# Patient Record
Sex: FEMALE | Race: BLACK OR AFRICAN AMERICAN | Employment: UNEMPLOYED | URBAN - METROPOLITAN AREA
[De-identification: names, ages, dates, MRNs, and addresses within clinical notes are randomized per-mention and may not be internally consistent; named-entity substitution may affect disease eponyms.]

---
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
## 2021-11-07 ENCOUNTER — APPOINTMENT (OUTPATIENT)
Dept: GENERAL RADIOLOGY | Age: 21
End: 2021-11-07
Payer: COMMERCIAL

## 2021-11-07 ENCOUNTER — HOSPITAL ENCOUNTER (EMERGENCY)
Age: 21
Discharge: HOME | End: 2021-11-07
Attending: EMERGENCY MEDICINE
Payer: COMMERCIAL

## 2021-11-07 VITALS
TEMPERATURE: 98.4 F | OXYGEN SATURATION: 99 % | DIASTOLIC BLOOD PRESSURE: 79 MMHG | SYSTOLIC BLOOD PRESSURE: 118 MMHG | HEART RATE: 79 BPM | RESPIRATION RATE: 16 BRPM

## 2021-11-07 DIAGNOSIS — J45.901 MILD ASTHMA WITH EXACERBATION, UNSPECIFIED WHETHER PERSISTENT: Primary | ICD-10-CM

## 2021-11-07 PROCEDURE — 71046 X-RAY EXAM CHEST 2 VIEWS: CPT

## 2021-11-07 PROCEDURE — 6370000000 HC RX 637 (ALT 250 FOR IP): Performed by: STUDENT IN AN ORGANIZED HEALTH CARE EDUCATION/TRAINING PROGRAM

## 2021-11-07 PROCEDURE — 99284 EMERGENCY DEPT VISIT MOD MDM: CPT

## 2021-11-07 RX ORDER — ALBUTEROL SULFATE 90 UG/1
1-2 AEROSOL, METERED RESPIRATORY (INHALATION) EVERY 4 HOURS PRN
Qty: 1 EACH | Refills: 0 | Status: SHIPPED | OUTPATIENT
Start: 2021-11-07

## 2021-11-07 RX ORDER — PREDNISONE 20 MG/1
60 TABLET ORAL DAILY
Qty: 12 TABLET | Refills: 0 | Status: SHIPPED | OUTPATIENT
Start: 2021-11-07 | End: 2021-11-11

## 2021-11-07 RX ORDER — PREDNISONE 20 MG/1
60 TABLET ORAL ONCE
Status: COMPLETED | OUTPATIENT
Start: 2021-11-07 | End: 2021-11-07

## 2021-11-07 RX ORDER — BUDESONIDE AND FORMOTEROL FUMARATE DIHYDRATE 160; 4.5 UG/1; UG/1
2 AEROSOL RESPIRATORY (INHALATION) 2 TIMES DAILY
Qty: 30.6 G | Refills: 0 | Status: SHIPPED | OUTPATIENT
Start: 2021-11-07

## 2021-11-07 RX ADMIN — PREDNISONE 60 MG: 20 TABLET ORAL at 11:39

## 2021-11-07 NOTE — ED PROVIDER NOTES
[facility]  ED  Emergency Department Encounter  EmergencyMedicine Resident     Pt Name:Misty Jarquin  MRN: [id number]  [place] 2000  Date of evaluation: 11/7/21  PCP:  Dionicio Edmondson MD    [address] [place] [address]       Chief Complaint   Patient presents with    Shortness of Breath       HISTORY OF PRESENT ILLNESS  (Location/Symptom, Timing/Onset, Context/Setting, Quality, Duration, Modifying Factors, Severity.)      Patricia Chaudhary is a 22 y.o. female who presents with acute onset shortness of breath for past couple hours. Patient believes she is having an asthma exacerbation. She has been out of town staying in [place] [address] with friends that have cats and other allergens present. States that animals and dust is a trigger for her. States that she is out of her medications, takes albuterol and a \"red inhaler\". Came by EMS, got 1 treatment of albuterol prior to arrival.  She is denying chest pain shortness breath at this time, 9 nausea vomiting fever diarrhea. PAST MEDICAL / SURGICAL / SOCIAL / FAMILY HISTORY      has a past medical history of Asthma, Atopic dermatitis, Eczema, Impaired glucose tolerance, Obesity, Pneumonia, and Pre-diabetes. has a past surgical history that includes Adenoidectomy; Tonsillectomy; fracture surgery (9/9/12 ); and Leg Surgery (Right, 6/22/2015). Social History     Socioeconomic History    Marital status: Single     Spouse name: Not on file    Number of children: Not on file    Years of education: Not on file    Highest education level: Not on file   Occupational History    Not on file   Tobacco Use    Smoking status: Never Smoker    Smokeless tobacco: Never Used   Substance and Sexual Activity    Alcohol use: No    Drug use: No    Sexual activity: Never   Other Topics Concern    Not on file   Social History Narrative    Lives at home with mom, 1 sister. Attends school, in 6th grade.      Social Determinants of Health     Financial Resource Strain:     Difficulty of Paying Living Expenses: Not on file   Food Insecurity:     Worried About Running Out of Food in the Last Year: Not on file    Willow of Food in the Last Year: Not on file   Transportation Needs:     Lack of Transportation (Medical): Not on file    Lack of Transportation (Non-Medical):  Not on file   Physical Activity:     Days of Exercise per Week: Not on file    Minutes of Exercise per Session: Not on file   Stress:     Feeling of Stress : Not on file   Social Connections:     Frequency of Communication with Friends and Family: Not on file    Frequency of Social Gatherings with Friends and Family: Not on file    Attends [employer] Services: Not on file    Active Member of Clubs or Organizations: Not on file    Attends Club or Organization Meetings: Not on file    Marital Status: Not on file   Intimate Partner Violence:     Fear of Current or Ex-Partner: Not on file    Emotionally Abused: Not on file    Physically Abused: Not on file    Sexually Abused: Not on file   Housing Stability:     Unable to Pay for Housing in the Last Year: Not on file    Number of Places Lived in the Last Year: Not on file    Unstable Housing in the Last Year: Not on file       Family History   Problem Relation Age of Onset    Depression Maternal Grandmother     Heart Disease Maternal Grandmother         CHF    Cancer Maternal Grandfather         colon (45yrs)    Depression Maternal Grandfather     High Cholesterol Maternal Grandfather     Other Mother         victim of domestic violence    Depression Mother     Diabetes Father         Type 2, insulin requiring    Arthritis Neg Hx     Asthma Neg Hx     Birth Defects Neg Hx     Early Death Neg Hx     Hearing Loss Neg Hx     High Blood Pressure Neg Hx     Kidney Disease Neg Hx     Learning Disabilities Neg Hx     Mental Illness Neg Hx     Mental Retardation Neg Hx     Miscarriages / Stillbirths Neg Hx     Stroke Neg Hx     for color change. Allergic/Immunologic: Negative for immunocompromised state. Neurological: Negative for dizziness. Hematological: Does not bruise/bleed easily. Psychiatric/Behavioral: Negative for agitation. PHYSICAL EXAM   (up to 7 for level 4, 8 or more for level 5)      INITIAL VITALS:   There were no vitals taken for this visit. Physical Exam  Constitutional:       General: Not in acute distress. Appearance: Normal appearance. Normal weight. Not toxic-appearing. HENT:      Head: Normocephalic and atraumatic. Nose: Nose normal.      Mouth/Throat: Mucous membranes are moist.  Uvula midline no oropharyngeal edema. Pharynx: Oropharynx is clear. Eyes:      Extraocular Movements: Extraocular movements intact. Conjunctiva/sclera: Conjunctivae normal.      Pupils: Pupils are equal, round, and reactive to light. Neck:      Musculoskeletal: Normal range of motion and neck supple. No neck rigidity. Cardiovascular:      Rate and Rhythm: Normal rate and regular rhythm. Pulses: Normal pulses. Heart sounds: Normal heart sounds. No murmur. Pulmonary:      Effort: Pulmonary effort is normal.      Breath sounds: Normal breath sounds. No wheezing. Abdominal:      General: Abdomen is flat. Bowel sounds are normal.      Tenderness: There is no abdominal tenderness. Musculoskeletal:     Normal range of motion. General: No swelling or tenderness. No LE edema    Skin:     General: Skin is warm. Capillary Refill: Capillary refill takes less than 2 seconds. Coloration: Skin is not jaundiced. Neurological:      General: No focal deficit present. Mental Status: Alert and oriented to person, place, and time. Mental status is at baseline. Motor: No weakness.        DIFFERENTIAL  DIAGNOSIS     PLAN (LABS / IMAGING / EKG):  Orders Placed This Encounter   Procedures    XR CHEST (2 VW)       MEDICATIONS ORDERED:  Orders Placed This Encounter Medications    predniSONE (DELTASONE) tablet 60 mg    albuterol sulfate HFA (PROVENTIL HFA) 108 (90 Base) MCG/ACT inhaler     Sig: Inhale 1-2 puffs into the lungs every 4 hours as needed for Wheezing     Dispense:  1 each     Refill:  0    budesonide-formoterol (SYMBICORT) 160-4.5 MCG/ACT AERO     Sig: Inhale 2 puffs into the lungs 2 times daily     Dispense:  30.6 g     Refill:  0    predniSONE (DELTASONE) 20 MG tablet     Sig: Take 3 tablets by mouth daily for 4 days     Dispense:  12 tablet     Refill:  0           DIAGNOSTIC RESULTS / EMERGENCY DEPARTMENT COURSE / [facility]     LABS:  No results found for this visit on 11/07/21. RADIOLOGY:  XR CHEST (2 VW)    Result Date: 11/7/2021  EXAMINATION: TWO XRAY VIEWS OF THE CHEST 11/7/2021 11:46 am COMPARISON: 05/24/2020 HISTORY: ORDERING SYSTEM PROVIDED HISTORY: asthma sob morbidly obese TECHNOLOGIST PROVIDED HISTORY: asthma sob morbidly obese Reason for Exam: asthma sob morbidly obese Acuity: Unknown FINDINGS: The cardiomediastinal silhouette is within normal limits. Normal lung volumes. There is no consolidation, pneumothorax or evidence for edema. No evidence for effusion. No acute osseous abnormality is identified. No acute airspace disease identified. EKG  None    All EKG's are interpreted by the Emergency Department Physician who either signs or Co-signs this chart in the absence of a cardiologist.    EMERGENCY DEPARTMENT COURSE:  Patient breathing quietly and unlabored on room air. Speech is normal and speaking in full sentences without requiring to pause to take a breath. Vital signs stable afebrile SPO2 99% on room air. Patient is extremely morbidly obese, no wheezing with auscultation no adventitious heart or lung sounds. Will do chest x-ray to rule out bacterial infiltrate and give steroid burst, will prescribe home meds and short steroid burst.  Gave return precautions.   Patient will follow up with PCP.    PROCEDURES:  None    CONSULTS:  None    CRITICAL CARE:  None    FINAL IMPRESSION      1.  Mild asthma with exacerbation, unspecified whether persistent          DISPOSITION / PLAN     DISPOSITION Decision To Discharge 11/07/2021 12:08:13 PM      PATIENT REFERRED TO:  Jose Elias Campos MD  [address] [place] [phone]    Schedule an appointment as soon as possible for a visit in 2 days        DISCHARGE MEDICATIONS:  New Prescriptions    BUDESONIDE-FORMOTEROL (SYMBICORT) 160-4.5 MCG/ACT AERO    Inhale 2 puffs into the lungs 2 times daily    PREDNISONE (DELTASONE) 20 MG TABLET    Take 3 tablets by mouth daily for 4 days       Jen Irby MD  Emergency Medicine Resident    (Please note that portions of thisnote were completed with a voice recognition program.  Efforts were made to edit the dictations but occasionally words are mis-transcribed.)       Jen Irby MD  Resident  11/07/21 [id number]

## 2021-11-07 NOTE — ED PROVIDER NOTES
[facility] [address] [facility]     Emergency Department     Faculty Attestation        I performed a history and physical examination of the patient and discussed management with the resident. I reviewed the residents note and agree with the documented findings and plan of care. Any areas of disagreement are noted on the chart. I was personally present for the key portions of any procedures. I have documented in the chart those procedures where I was not present during the key portions. I have reviewed the emergency nurses triage note. I agree with the chief complaint, past medical history, past surgical history, allergies, medications, social and family history as documented unless otherwise noted below. For mid-level providers such as nurse practitioners as well as physicians assistants:    I have personally seen and evaluated the patient. I find the patient's history and physical exam are consistent with NP/PA documentation. I agree with the care provided, treatment rendered, disposition, & follow-up plan. Additional findings are as noted. Vital Signs: There were no vitals taken for this visit. PCP:  Hilton Shields MD    Pertinent Comments:     Patient with a history of asthma has run out of her medication she came in by ambulance and received a breathing treatment for arrival she states she feels completely back to normal.  Resting comfortably no acute distress. No chest pain or cough. Lungs clear.       Critical Care  None          Sherita Thomas MD    Attending Emergency Medicine Physician              Vasyl Pruitt MD  11/07/21 2544

## 2021-11-07 NOTE — ED TRIAGE NOTES
Pt is alert and oriented. Pt reports she has a hx of asthma. She is from out of the area and she did not have her meds. She had one dose of Albuterol per EMS and now feels back to baseline.  Pt Is 99% on RA

## 2021-12-23 ENCOUNTER — HOSPITAL ENCOUNTER (EMERGENCY)
Age: 21
Discharge: HOME | End: 2021-12-23
Attending: EMERGENCY MEDICINE
Payer: COMMERCIAL

## 2021-12-23 VITALS
BODY MASS INDEX: 41.95 KG/M2 | WEIGHT: 293 LBS | HEIGHT: 70 IN | RESPIRATION RATE: 20 BRPM | SYSTOLIC BLOOD PRESSURE: 134 MMHG | HEART RATE: 100 BPM | TEMPERATURE: 97.7 F | DIASTOLIC BLOOD PRESSURE: 68 MMHG

## 2021-12-23 DIAGNOSIS — J02.9 ACUTE PHARYNGITIS, UNSPECIFIED ETIOLOGY: Primary | ICD-10-CM

## 2021-12-23 LAB
DIRECT EXAM: ABNORMAL
Lab: ABNORMAL
SPECIMEN DESCRIPTION: ABNORMAL

## 2021-12-23 PROCEDURE — 99282 EMERGENCY DEPT VISIT SF MDM: CPT

## 2021-12-23 PROCEDURE — 6360000002 HC RX W HCPCS: Performed by: EMERGENCY MEDICINE

## 2021-12-23 PROCEDURE — 87880 STREP A ASSAY W/OPTIC: CPT

## 2021-12-23 RX ORDER — AZITHROMYCIN 250 MG/1
250 TABLET, FILM COATED ORAL SEE ADMIN INSTRUCTIONS
Qty: 6 TABLET | Refills: 0 | Status: SHIPPED | OUTPATIENT
Start: 2021-12-23 | End: 2021-12-28

## 2021-12-23 RX ORDER — DEXAMETHASONE SODIUM PHOSPHATE 10 MG/ML
10 INJECTION, SOLUTION INTRAMUSCULAR; INTRAVENOUS ONCE
Status: COMPLETED | OUTPATIENT
Start: 2021-12-23 | End: 2021-12-23

## 2021-12-23 RX ADMIN — DEXAMETHASONE SODIUM PHOSPHATE 10 MG: 10 INJECTION, SOLUTION INTRAMUSCULAR; INTRAVENOUS at 07:06

## 2021-12-23 ASSESSMENT — ENCOUNTER SYMPTOMS
EYE REDNESS: 0
NAUSEA: 0
VOMITING: 0
EYE DISCHARGE: 0
COUGH: 0
RHINORRHEA: 0
SHORTNESS OF BREATH: 0
DIARRHEA: 0
COLOR CHANGE: 0
SORE THROAT: 1

## 2021-12-23 ASSESSMENT — PAIN SCALES - GENERAL: PAINLEVEL_OUTOF10: 8

## 2021-12-23 ASSESSMENT — PAIN DESCRIPTION - LOCATION: LOCATION: THROAT

## 2021-12-23 NOTE — ED PROVIDER NOTES
EMERGENCY DEPARTMENT ENCOUNTER    Pt Name: Rudy Presley  MRN: [id number]  [place] 2000  Date of evaluation: 12/23/21  CHIEF COMPLAINT       Chief Complaint   Patient presents with    Pharyngitis     c/o swelling to neck      HISTORY OF PRESENT ILLNESS   Is a 28-year-old female that presents with complaints of sore throat and congestion, patient states that she began having a sore throat a few days ago, she describes pain and discomfort with her swallowing, she states she is having difficulty eating and drinking. Patient describes her symptoms as severe. REVIEW OF SYSTEMS     Review of Systems   Constitutional: Negative for chills and fever. HENT: Positive for sore throat. Negative for rhinorrhea. Eyes: Negative for discharge, redness and visual disturbance. Respiratory: Negative for cough and shortness of breath. Cardiovascular: Negative for chest pain, palpitations and leg swelling. Gastrointestinal: Negative for diarrhea, nausea and vomiting. Genitourinary: Negative for dysuria and frequency. Musculoskeletal: Negative for arthralgias, myalgias and neck pain. Skin: Negative for color change and rash. Neurological: Negative for seizures, weakness and headaches. Psychiatric/Behavioral: Negative for hallucinations, self-injury and suicidal ideas.      PASTMEDICAL HISTORY     Past Medical History:   Diagnosis Date    Asthma     Atopic dermatitis 4/27/2012    Eczema     Impaired glucose tolerance     Obesity 4/27/2012    Pneumonia     Pre-diabetes      Past Problem List  Patient Active Problem List   Diagnosis Code    Asthma J45.909    Pneumonia J18.9    Obesity E66.9    Acanthosis nigricans L83    Atopic dermatitis L20.9    Insulin resistance E88.81    Bronchitis J40    Sleep apnea G47.30    Impaired glucose tolerance R73.02    Knee deformity, acquired M21.969    Irregular menses N92.6     SURGICAL HISTORY       Past Surgical History:   Procedure Laterality Date    ADENOIDECTOMY      FRACTURE SURGERY  9/9/12     left femur fx    LEG SURGERY Right 6/22/2015    Right pan genu epiphysiodesis. 2. Long leg cast application right leg    TONSILLECTOMY       CURRENT MEDICATIONS       Previous Medications    ALBUTEROL SULFATE HFA (PROVENTIL HFA) 108 (90 BASE) MCG/ACT INHALER    Inhale 1-2 puffs into the lungs every 4 hours as needed for Wheezing    BUDESONIDE-FORMOTEROL (SYMBICORT) 160-4.5 MCG/ACT AERO    Inhale 2 puffs into the lungs 2 times daily    HYDROCORTISONE 1 % CREAM    Apply  topically 2 times daily. Apply topically 2 times daily. IBUPROFEN (ADVIL;MOTRIN) 400 MG TABLET    Take 1 tablet by mouth every 6 hours as needed for Pain    MONTELUKAST (SINGULAIR) 10 MG TABLET    Take 1 tablet by mouth daily    NORGESTIM-ETH ESTRAD TRIPHASIC (TRI-SPRINTEC) 0.18/0.215/0.25 MG-35 MCG TABS    Take 1 tablet by mouth daily    RESPIRATORY THERAPY SUPPLIES MIGUE    by Does not apply route. CPAP machine    TRUEPLUS LANCETS 30G MISC    Trueplus Lancets  Disp: #100  For blood glucose testing 3 - 4 times per day. ALLERGIES     has No Known Allergies. FAMILY HISTORY     She indicated that her mother is alive. She indicated that her father is alive. She indicated that her sister is alive. She indicated that her maternal grandmother is alive. She indicated that her maternal grandfather is alive. She indicated that the status of her neg hx is unknown. SOCIAL HISTORY       Social History     Tobacco Use    Smoking status: Never Smoker    Smokeless tobacco: Never Used   Substance Use Topics    Alcohol use: No    Drug use: No     PHYSICAL EXAM     INITIAL VITALS: /68   Pulse 100   Temp 97.7 °F (36.5 °C) (Oral)   Resp 20   Ht 5' 10\" (1.778 m)   Wt (!) 322 lb (146.1 kg)   LMP 11/05/2021   BMI 46.20 kg/m²    Physical Exam  Constitutional:       Appearance: Normal appearance. HENT:      Head: Normocephalic and atraumatic.       Mouth/Throat:      Mouth: No oral lesions. Pharynx: Posterior oropharyngeal erythema present. No pharyngeal swelling, oropharyngeal exudate or uvula swelling. Eyes:      Extraocular Movements: Extraocular movements intact. Pupils: Pupils are equal, round, and reactive to light. Cardiovascular:      Rate and Rhythm: Normal rate and regular rhythm. Pulmonary:      Effort: Pulmonary effort is normal.      Breath sounds: Normal breath sounds. Abdominal:      General: Abdomen is flat. Palpations: Abdomen is soft. Tenderness: There is no abdominal tenderness. Neurological:      Mental Status: She is alert. MEDICAL DECISION MAKIN-year-old female, pharyngitis, no evidence of abscess, plan is strep screen steroids antibiotics and discharge. CRITICAL CARE:       PROCEDURES:    Procedures    DIAGNOSTIC RESULTS   EKG:All EKG's are interpreted by the Emergency Department Physician who either signs or Co-signs this chart in the absence of a cardiologist.        RADIOLOGY:All plain film, CT, MRI, and formal ultrasound images (except ED bedside ultrasound) are read by the radiologist, see reports below, unless otherwisenoted in MDM or here. No orders to display     LABS: All lab results were reviewed by myself, and all abnormals are listed below.   Labs Reviewed   STREP SCREEN GROUP A THROAT       EMERGENCY DEPARTMENTCOURSE:         Vitals:    Vitals:    21 0628   BP: 134/68   Pulse: 100   Resp: 20   Temp: 97.7 °F (36.5 °C)   TempSrc: Oral   Weight: (!) 322 lb (146.1 kg)   Height: 5' 10\" (1.778 m)       The patient was given the following medications while in the emergency department:  Orders Placed This Encounter   Medications    dexamethasone (PF) (DECADRON) injection 10 mg    azithromycin (ZITHROMAX) 250 MG tablet     Sig: Take 1 tablet by mouth See Admin Instructions for 5 days 500mg on day 1 followed by 250mg on days 2 - 5     Dispense:  6 tablet     Refill:  0    benzocaine-menthol (CEPACOL SORE THROAT) 15-3.6 MG lozenge     Sig: Take 1 lozenge by mouth every 2 hours as needed for Sore Throat     Dispense:  18 lozenge     Refill:  1     CONSULTS:  None    FINAL IMPRESSION      1. Acute pharyngitis, unspecified etiology          DISPOSITION/PLAN   DISPOSITION Decision To Discharge 12/23/2021 06:52:37 AM      PATIENT REFERRED TO:  Linda Grant, [address]  [phone]    Schedule an appointment as soon as possible for a visit in 2 days      DISCHARGE MEDICATIONS:  New Prescriptions    AZITHROMYCIN (ZITHROMAX) 250 MG TABLET    Take 1 tablet by mouth See Admin Instructions for 5 days 500mg on day 1 followed by 250mg on days 2 - 5    BENZOCAINE-MENTHOL (CEPACOL SORE THROAT) 15-3.6 MG LOZENGE    Take 1 lozenge by mouth every 2 hours as needed for Sore Throat     The care is provided during an unprecedented national emergency due to the novel coronavirus, COVID 19.   Calhoun Goodpasture, MD  Attending Emergency Physician                   Calhoun Goodpasture, MD  12/23/21 [id number]

## 2022-07-08 ENCOUNTER — TELEPHONE (OUTPATIENT)
Dept: DERMATOLOGY | Age: 22
End: 2022-07-08

## 2024-06-27 ENCOUNTER — HOSPITAL ENCOUNTER (OUTPATIENT)
Age: 24
Discharge: HOME | End: 2024-06-27
Payer: COMMERCIAL

## 2024-06-27 LAB
25(OH)D3 SERPL-MCNC: 7 NG/ML (ref 30–100)
ALBUMIN SERPL-MCNC: 4.6 G/DL (ref 3.5–5.2)
ALBUMIN/GLOB SERPL: 2 {RATIO} (ref 1–2.5)
ALP SERPL-CCNC: 80 U/L (ref 35–104)
ALT SERPL-CCNC: 29 U/L (ref 10–35)
ANION GAP SERPL CALCULATED.3IONS-SCNC: 11 MMOL/L (ref 9–16)
AST SERPL-CCNC: 24 U/L (ref 10–35)
BILIRUB DIRECT SERPL-MCNC: <0.2 MG/DL (ref 0–0.3)
BILIRUB INDIRECT SERPL-MCNC: NORMAL MG/DL (ref 0–1)
BILIRUB SERPL-MCNC: 0.2 MG/DL (ref 0–1.2)
BUN SERPL-MCNC: 11 MG/DL (ref 6–20)
CALCIUM SERPL-MCNC: 9.8 MG/DL (ref 8.6–10.4)
CHLORIDE SERPL-SCNC: 107 MMOL/L (ref 98–107)
CHOLEST SERPL-MCNC: 206 MG/DL (ref 0–199)
CHOLESTEROL/HDL RATIO: 5
CO2 SERPL-SCNC: 21 MMOL/L (ref 20–31)
CREAT SERPL-MCNC: 0.8 MG/DL (ref 0.5–0.9)
ERYTHROCYTE [DISTWIDTH] IN BLOOD BY AUTOMATED COUNT: 14.4 % (ref 11.8–14.4)
ERYTHROCYTE [SEDIMENTATION RATE] IN BLOOD BY PHOTOMETRIC METHOD: 37 MM/HR (ref 0–20)
EST. AVERAGE GLUCOSE BLD GHB EST-MCNC: 103 MG/DL
GFR, ESTIMATED: >90 ML/MIN/1.73M2
GLOBULIN SER CALC-MCNC: 2.9 G/DL
GLUCOSE SERPL-MCNC: 192 MG/DL (ref 74–99)
HBA1C MFR BLD: 5.2 % (ref 4–6)
HCT VFR BLD AUTO: 45.5 % (ref 36.3–47.1)
HDLC SERPL-MCNC: 38 MG/DL
HGB BLD-MCNC: 14.2 G/DL (ref 11.9–15.1)
HIV 1 & 2 AB SERPLBLD IA.RAPID: NEGATIVE
HIV 1+2 AB+HIV1 P24 AG SERPL QL IA: REACTIVE
HIV 2 AB SERPLBLD QL IA.RAPID: NEGATIVE
HIV1 AB SERPLBLD QL IA.RAPID: NEGATIVE
LDLC SERPL CALC-MCNC: 154 MG/DL (ref 0–100)
MCH RBC QN AUTO: 26.9 PG (ref 25.2–33.5)
MCHC RBC AUTO-ENTMCNC: 31.2 G/DL (ref 28.4–34.8)
MCV RBC AUTO: 86.3 FL (ref 82.6–102.9)
NRBC BLD-RTO: 0 PER 100 WBC
PLATELET # BLD AUTO: 335 K/UL (ref 138–453)
PMV BLD AUTO: 11.4 FL (ref 8.1–13.5)
POTASSIUM SERPL-SCNC: 4.6 MMOL/L (ref 3.7–5.3)
PROT SERPL-MCNC: 7.5 G/DL (ref 6.6–8.7)
RBC # BLD AUTO: 5.27 M/UL (ref 3.95–5.11)
SICKLE CELL SCREEN: NEGATIVE
SODIUM SERPL-SCNC: 139 MMOL/L (ref 136–145)
T PALLIDUM AB SER QL IA: REACTIVE
TRIGL SERPL-MCNC: 70 MG/DL
TSH SERPL DL<=0.05 MIU/L-ACNC: 1.63 UIU/ML (ref 0.27–4.2)
VLDLC SERPL CALC-MCNC: 14 MG/DL
WBC OTHER # BLD: 14.7 K/UL (ref 3.5–11.3)

## 2024-06-27 PROCEDURE — 36415 COLL VENOUS BLD VENIPUNCTURE: CPT

## 2024-06-27 PROCEDURE — 84443 ASSAY THYROID STIM HORMONE: CPT

## 2024-06-27 PROCEDURE — 86702 HIV-2 ANTIBODY: CPT

## 2024-06-27 PROCEDURE — 80061 LIPID PANEL: CPT

## 2024-06-27 PROCEDURE — 80048 BASIC METABOLIC PNL TOTAL CA: CPT

## 2024-06-27 PROCEDURE — 86593 SYPHILIS TEST NON-TREP QUANT: CPT

## 2024-06-27 PROCEDURE — 86696 HERPES SIMPLEX TYPE 2 TEST: CPT

## 2024-06-27 PROCEDURE — 83036 HEMOGLOBIN GLYCOSYLATED A1C: CPT

## 2024-06-27 PROCEDURE — 86694 HERPES SIMPLEX NES ANTBDY: CPT

## 2024-06-27 PROCEDURE — 87389 HIV-1 AG W/HIV-1&-2 AB AG IA: CPT

## 2024-06-27 PROCEDURE — 80076 HEPATIC FUNCTION PANEL: CPT

## 2024-06-27 PROCEDURE — 85027 COMPLETE CBC AUTOMATED: CPT

## 2024-06-27 PROCEDURE — 85652 RBC SED RATE AUTOMATED: CPT

## 2024-06-27 PROCEDURE — 86780 TREPONEMA PALLIDUM: CPT

## 2024-06-27 PROCEDURE — 85660 RBC SICKLE CELL TEST: CPT

## 2024-06-27 PROCEDURE — 86695 HERPES SIMPLEX TYPE 1 TEST: CPT

## 2024-06-27 PROCEDURE — 86701 HIV-1ANTIBODY: CPT

## 2024-06-27 PROCEDURE — 82306 VITAMIN D 25 HYDROXY: CPT

## 2024-06-28 LAB — VDRL SER-TITR: 4 {TITER}

## 2024-07-02 LAB
HSV1 IGG SERPL QL IA: 0.64
HSV1+2 IGM SER QL IA: 0.42
HSV2 AB SER QL IA: 6.9

## 2024-08-29 ENCOUNTER — OFFICE VISIT (OUTPATIENT)
Dept: ORTHOPEDIC SURGERY | Age: 24
End: 2024-08-29
Payer: COMMERCIAL

## 2024-08-29 DIAGNOSIS — M21.70 LEG LENGTH DISCREPANCY: Primary | ICD-10-CM

## 2024-08-29 DIAGNOSIS — E66.01 CLASS 3 SEVERE OBESITY WITHOUT SERIOUS COMORBIDITY WITH BODY MASS INDEX (BMI) OF 50.0 TO 59.9 IN ADULT, UNSPECIFIED OBESITY TYPE (HCC): ICD-10-CM

## 2024-08-29 DIAGNOSIS — R52 PAIN: Primary | ICD-10-CM

## 2024-08-29 DIAGNOSIS — R52 PAIN: ICD-10-CM

## 2024-08-29 PROCEDURE — G8427 DOCREV CUR MEDS BY ELIG CLIN: HCPCS

## 2024-08-29 PROCEDURE — 99203 OFFICE O/P NEW LOW 30 MIN: CPT

## 2024-08-29 PROCEDURE — 1036F TOBACCO NON-USER: CPT

## 2024-08-29 PROCEDURE — G8417 CALC BMI ABV UP PARAM F/U: HCPCS

## 2024-08-29 RX ORDER — CELECOXIB 200 MG/1
200 CAPSULE ORAL DAILY
Qty: 30 CAPSULE | Refills: 1 | Status: SHIPPED | OUTPATIENT
Start: 2024-08-29

## 2024-08-29 NOTE — PROGRESS NOTES
[facility]  [address]  [place] [address]  Dept: [phone]    Ambulatory Orthopedic New Patient Visit      CHIEF COMPLAINT:    Chief Complaint   Patient presents with    Pain     Leg length discrepancy        HISTORY OF PRESENT ILLNESS:      Date of Injury: 2012- epiphyseal fracture of the left distal femoral epiphysis.     The patient is a 24 y.o. female who is being seen  for consultation and evaluation of her bilateral knee pain. She is accompanied today by her mother. She has seen Dr. Sue in the past for the bilateral knee pain.  She has an orthopedic surgery history significant for a epiphysiodesis of the right leg after sustaining an epiphyseal fracture of the left distal femoral epiphysis which then led to premature fusion.  She was last seen in the office for knee pain by Dr. Sue and she was given a 1-1/2 inch shoe lift for the left side.  She states that she never got this shoe lift.  She states that she has continued to experience significant pain throughout both knees and that the pain is worse with walking.  She states that she has tried over-the-counter knee braces as well as Tylenol and ibuprofen with minimal relief.  She does state that the knee pain has kept her from working and she was currently unemployed.      Past Medical History:    Past Medical History:   Diagnosis Date    Asthma     Atopic dermatitis 4/27/2012    Eczema     Impaired glucose tolerance     Obesity 4/27/2012    Pneumonia     Pre-diabetes        Past Surgical History:    Past Surgical History:   Procedure Laterality Date    ADENOIDECTOMY      FRACTURE SURGERY  9/9/12     left femur fx    LEG SURGERY Right 6/22/2015    Right pan genu epiphysiodesis. 2. Long leg cast application right leg    TONSILLECTOMY         Current Medications:   Current Outpatient Medications   Medication Sig Dispense Refill    benzocaine-menthol (CEPACOL SORE THROAT)

## 2024-08-30 VITALS — BODY MASS INDEX: 41.95 KG/M2 | HEIGHT: 70 IN | WEIGHT: 293 LBS

## 2025-03-05 ENCOUNTER — APPOINTMENT (OUTPATIENT)
Dept: GENERAL RADIOLOGY | Age: 25
End: 2025-03-05
Payer: COMMERCIAL

## 2025-03-05 ENCOUNTER — HOSPITAL ENCOUNTER (EMERGENCY)
Age: 25
Discharge: HOME | End: 2025-03-05
Attending: STUDENT IN AN ORGANIZED HEALTH CARE EDUCATION/TRAINING PROGRAM
Payer: COMMERCIAL

## 2025-03-05 VITALS
HEIGHT: 70 IN | HEART RATE: 103 BPM | RESPIRATION RATE: 19 BRPM | TEMPERATURE: 98.2 F | WEIGHT: 293 LBS | BODY MASS INDEX: 41.95 KG/M2 | OXYGEN SATURATION: 95 % | DIASTOLIC BLOOD PRESSURE: 88 MMHG | SYSTOLIC BLOOD PRESSURE: 145 MMHG

## 2025-03-05 DIAGNOSIS — S86.912A KNEE STRAIN, LEFT, INITIAL ENCOUNTER: Primary | ICD-10-CM

## 2025-03-05 PROCEDURE — 73562 X-RAY EXAM OF KNEE 3: CPT

## 2025-03-05 PROCEDURE — 99283 EMERGENCY DEPT VISIT LOW MDM: CPT

## 2025-03-05 PROCEDURE — 6370000000 HC RX 637 (ALT 250 FOR IP): Performed by: STUDENT IN AN ORGANIZED HEALTH CARE EDUCATION/TRAINING PROGRAM

## 2025-03-05 RX ORDER — ACETAMINOPHEN 500 MG
1000 TABLET ORAL ONCE
Status: COMPLETED | OUTPATIENT
Start: 2025-03-05 | End: 2025-03-05

## 2025-03-05 RX ADMIN — ACETAMINOPHEN 1000 MG: 500 TABLET ORAL at 10:02

## 2025-03-05 ASSESSMENT — PAIN - FUNCTIONAL ASSESSMENT: PAIN_FUNCTIONAL_ASSESSMENT: 0-10

## 2025-03-05 ASSESSMENT — LIFESTYLE VARIABLES
HOW OFTEN DO YOU HAVE A DRINK CONTAINING ALCOHOL: NEVER
HOW MANY STANDARD DRINKS CONTAINING ALCOHOL DO YOU HAVE ON A TYPICAL DAY: PATIENT DOES NOT DRINK

## 2025-03-05 ASSESSMENT — PAIN SCALES - GENERAL: PAINLEVEL_OUTOF10: 8

## 2025-03-05 ASSESSMENT — PAIN DESCRIPTION - ORIENTATION: ORIENTATION: LEFT

## 2025-03-05 ASSESSMENT — PAIN DESCRIPTION - LOCATION: LOCATION: KNEE

## 2025-03-05 NOTE — ED PROVIDER NOTES
[facility] EMERGENCY DEPARTMENT  EMERGENCY DEPARTMENT ENCOUNTER    Pt Name: Misty Golden  MRN: [id number]  Birthdate2000  Date of evaluation: 3/5/2025    Note Started: 9:41 AM EST    CHIEF COMPLAINT       Chief Complaint   Patient presents with    Knee Pain     Left     HISTORY OF PRESENT ILLNESS    Misty Golden is a 24 y.o. female who presents for left knee pain ongoing after she twisted left knee between wooden boards causing the knee to extend and twist.  No fall or head injury.  No LOC.  Patient does have history of prior surgeries on the knee with hardware placement and removal.  Has not taken any pain medication today.  Patient is limping on the leg.    REVIEW OF SYSTEMS       Review of Systems   Musculoskeletal:  Positive for arthralgias and gait problem.   Neurological:  Negative for syncope and headaches.       PAST MEDICAL HISTORY    has a past medical history of Asthma, Atopic dermatitis, Eczema, Impaired glucose tolerance, Obesity, Pneumonia, and Pre-diabetes.    SURGICAL HISTORY      has a past surgical history that includes Adenoidectomy; Tonsillectomy; fracture surgery (9/9/12 ); and Leg Surgery (Right, 6/22/2015).    CURRENT MEDICATIONS       Discharge Medication List as of 3/5/2025 10:15 AM        CONTINUE these medications which have NOT CHANGED    Details   celecoxib (CELEBREX) 200 MG capsule Take 1 capsule by mouth daily, Disp-30 capsule, R-1Normal      benzocaine-menthol (CEPACOL SORE THROAT) 15-3.6 MG lozenge Take 1 lozenge by mouth every 2 hours as needed for Sore Throat, Disp-18 lozenge, R-1Print      albuterol sulfate HFA (PROVENTIL HFA) 108 (90 Base) MCG/ACT inhaler Inhale 1-2 puffs into the lungs every 4 hours as needed for Wheezing, Disp-1 each, R-0Print      budesonide-formoterol (SYMBICORT) 160-4.5 MCG/ACT AERO Inhale 2 puffs into the lungs 2 times daily, Disp-30.6 g, R-0Print      montelukast (SINGULAIR) 10 MG tablet Take 1 tablet by mouth daily,

## 2025-03-05 NOTE — ED NOTES
Crutches given to patient. Set to 5'10\". Patient demonstrated proper crutch use. AVS reviewed with patient.

## 2025-03-05 NOTE — DISCHARGE INSTRUCTIONS
Your x-ray did not show any bony injury.  This could be related to a strain or ligament injury.    Please use a knee brace to help support the knee and the crutches to help ambulate.    Voltaren gel can also be used to help with your knee pain    Tylenol can be taken every 6 hours. Ibuprofen can be taken every 6 hours. It is recommended you alternate the two every three hours.     Example pain medication schedule:  - 9am: Tylenol (500-1000mg)  - 12 pm/noon: Ibuprofen (400-600mg)  - 3pm: Tylenol  - 6pm: Ibuprofen    Maximum dose of tylenol in a 24 hour period is 4000mg.     Please follow-up with your primary care physician as well as orthopedic surgery to

## 2025-03-05 NOTE — ED NOTES
Patient presents to ED for left knee pain  Reports she was walking yesterday when she tripped on some wood, causing it to pull her slipper and pull her leg out  Patient denies falling, did not head head, no LOC  Reports hx of Left leg injury that resulted in 3 surgeries, hardware placement and removal.  Patient reports she does not currently have any hardware in L leg  Patient a/o x 4 with NAD Noted, changed into gown, blanket provided for comfort.